# Patient Record
Sex: FEMALE | Race: BLACK OR AFRICAN AMERICAN | NOT HISPANIC OR LATINO | URBAN - METROPOLITAN AREA
[De-identification: names, ages, dates, MRNs, and addresses within clinical notes are randomized per-mention and may not be internally consistent; named-entity substitution may affect disease eponyms.]

---

## 2024-09-18 ENCOUNTER — OFFICE VISIT (OUTPATIENT)
Dept: URGENT CARE | Age: 34
End: 2024-09-18
Payer: COMMERCIAL

## 2024-09-18 VITALS
DIASTOLIC BLOOD PRESSURE: 85 MMHG | SYSTOLIC BLOOD PRESSURE: 129 MMHG | TEMPERATURE: 98.1 F | OXYGEN SATURATION: 98 % | HEART RATE: 59 BPM

## 2024-09-18 DIAGNOSIS — Z72.51 UNPROTECTED SEXUAL INTERCOURSE: ICD-10-CM

## 2024-09-18 DIAGNOSIS — N92.6 ABNORMAL MENSTRUAL PERIODS: ICD-10-CM

## 2024-09-18 DIAGNOSIS — A59.9 TRICHOMONIASIS: Primary | ICD-10-CM

## 2024-09-18 LAB
POC APPEARANCE, URINE: CLEAR
POC BILIRUBIN, URINE: NEGATIVE
POC BLOOD, URINE: ABNORMAL
POC COLOR, URINE: ABNORMAL
POC GLUCOSE, URINE: NEGATIVE MG/DL
POC KETONES, URINE: NEGATIVE MG/DL
POC LEUKOCYTES, URINE: NEGATIVE
POC NITRITE,URINE: NEGATIVE
POC PH, URINE: 7 PH
POC PROTEIN, URINE: NEGATIVE MG/DL
POC SPECIFIC GRAVITY, URINE: 1.01
POC TRICHOMONAS: POSITIVE
POC UROBILINOGEN, URINE: 0.2 EU/DL
PREGNANCY TEST URINE, POC: NEGATIVE

## 2024-09-18 PROCEDURE — 87591 N.GONORRHOEAE DNA AMP PROB: CPT

## 2024-09-18 PROCEDURE — 87491 CHLMYD TRACH DNA AMP PROBE: CPT

## 2024-09-18 RX ORDER — METRONIDAZOLE 500 MG/1
500 TABLET ORAL 2 TIMES DAILY
Qty: 14 TABLET | Refills: 0 | Status: SHIPPED | OUTPATIENT
Start: 2024-09-18 | End: 2024-09-25

## 2024-09-18 NOTE — PROGRESS NOTES
HPI:  Patient States that she was exposed to trich from her partner.  Pt states that she noticed vaginal odor for a few days, but no discharge.  No urinary symptoms.  No AP or back pain.  No n/v/diarrhea.  Pt states that she had GC in the past.  Pt is on her cycle now, but states that her cycles are irregular.       ROS:  No frequency/urgency/dysuria  NoAP or back pain  No vag discharge  +vaginal odor, no itching    PE:    A&O x3  NCAT  PERRLA, EOMI  TM clear bl  No pharyngeal erythema  RRR  CTAB  Abd:soft, nd, nt,+bs, no cva tndop  MOEx4  No focal deficit  Judgement normal    Results:  Trich+    A/P:  Trichomonas    Your test was positive for trichomonas and you will be treated with Flagyl.  Increase fluids.  We will call you with test results if you need further treatments.  Use condoms.  No intercourse for 10 days and your partner has to be treated for positive results.  Discuss blood work for other STDs with your doctor.  Rocephin will treat gonorrhea and doxycycline will treat chlamydia.  Eat yogurt and take probiotics when on medication.  Keep a diary of symptoms.  Recheck with your doctor as needed.

## 2024-09-19 LAB
C TRACH RRNA SPEC QL NAA+PROBE: NEGATIVE
N GONORRHOEA DNA SPEC QL PROBE+SIG AMP: NEGATIVE